# Patient Record
Sex: FEMALE | URBAN - METROPOLITAN AREA
[De-identification: names, ages, dates, MRNs, and addresses within clinical notes are randomized per-mention and may not be internally consistent; named-entity substitution may affect disease eponyms.]

---

## 2017-09-05 ENCOUNTER — EMERGENCY (EMERGENCY)
Facility: HOSPITAL | Age: 67
LOS: 1 days | Discharge: HOME | End: 2017-09-05
Attending: EMERGENCY MEDICINE | Admitting: EMERGENCY MEDICINE
Payer: SELF-PAY

## 2017-09-05 VITALS
DIASTOLIC BLOOD PRESSURE: 75 MMHG | RESPIRATION RATE: 17 BRPM | HEART RATE: 73 BPM | OXYGEN SATURATION: 98 % | SYSTOLIC BLOOD PRESSURE: 131 MMHG | TEMPERATURE: 98 F

## 2017-09-05 PROCEDURE — 73130 X-RAY EXAM OF HAND: CPT | Mod: 26,RT

## 2017-09-05 PROCEDURE — 99284 EMERGENCY DEPT VISIT MOD MDM: CPT | Mod: 25

## 2017-09-05 PROCEDURE — 99053 MED SERV 10PM-8AM 24 HR FAC: CPT

## 2017-09-05 RX ORDER — PANTOPRAZOLE SODIUM 20 MG/1
1 TABLET, DELAYED RELEASE ORAL
Qty: 0 | Refills: 0 | COMMUNITY

## 2017-09-05 NOTE — ED PROVIDER NOTE - MEDICAL DECISION MAKING DETAILS
67F with no pmhx comes in with R 4th finger deformity. Likely mallet finger vs. fracture. Xray, will splint with f/u with hand specialist.

## 2017-09-05 NOTE — ED PROVIDER NOTE - OBJECTIVE STATEMENT
67F with no pertinent pmhx comes in with right 4th digit pain after picking up something and feeling like her finger got "stuck" and her DIP got pulled into extreme flexion. She then felt some pain is was not able to the extend her DIP. Admits to only mild 1/10 pain, but mostly discomfort. Denies any other symptoms, no fever, chills, frequent injuries.

## 2017-09-05 NOTE — ED PROVIDER NOTE - ATTENDING CONTRIBUTION TO CARE
Seen and examined, pt. with inability to extend distal phalanx of R 4th finger, no fx on XR, splint for mallet finger.

## 2017-09-05 NOTE — ED ADULT TRIAGE NOTE - CHIEF COMPLAINT QUOTE
Pt c/o rt ring finger pain after picking up a pan yesterday, states "I think I bent it backwards, hard to straighten it up." No open cuts. +ROM in finger. Denies Medical hx.

## 2017-09-05 NOTE — ED PROVIDER NOTE - MUSCULOSKELETAL MINIMAL EXAM
right 4th digit/RANGE OF MOTION LIMITED right 4th digit minimally tender, full passive ROM, active ROM limited, unable to extend distal phalanx of R 4th digit./RANGE OF MOTION LIMITED

## 2017-09-14 NOTE — ED PROCEDURE NOTE - CPROC ED POST PROC CARE GUIDE1
Elevate the injured extremity as instructed./Instructed patient/caregiver to follow-up with primary care physician./Verbal/written post procedure instructions were given to patient/caregiver./Keep the cast/splint/dressing clean and dry./Instructed patient/caregiver regarding signs and symptoms of infection.

## 2017-12-01 PROBLEM — Z00.00 ENCOUNTER FOR PREVENTIVE HEALTH EXAMINATION: Status: ACTIVE | Noted: 2017-12-01

## 2017-12-28 ENCOUNTER — APPOINTMENT (OUTPATIENT)
Dept: ORTHOPEDIC SURGERY | Facility: CLINIC | Age: 67
End: 2017-12-28

## 2024-12-18 NOTE — ED ADULT TRIAGE NOTE - SPO2 (%)
Add High Risk Medication Management Associated Diagnosis?: No
Detail Level: Zone
Length Of Therapy: 1 month
98